# Patient Record
Sex: MALE | Race: WHITE | NOT HISPANIC OR LATINO | Employment: OTHER | URBAN - METROPOLITAN AREA
[De-identification: names, ages, dates, MRNs, and addresses within clinical notes are randomized per-mention and may not be internally consistent; named-entity substitution may affect disease eponyms.]

---

## 2020-01-15 ENCOUNTER — TELEPHONE (OUTPATIENT)
Dept: UROLOGY | Facility: CLINIC | Age: 80
End: 2020-01-15

## 2020-01-15 NOTE — TELEPHONE ENCOUNTER
----- Message from All Jhaveri sent at 1/15/2020 10:34 AM CST -----  Contact: Pt's wife  Please call Gregg's wife regarding his inability to urinate for the past 24hrs [phone] (home).

## 2020-01-16 ENCOUNTER — OFFICE VISIT (OUTPATIENT)
Dept: UROLOGY | Facility: CLINIC | Age: 80
End: 2020-01-16
Payer: MEDICARE

## 2020-01-16 VITALS
RESPIRATION RATE: 18 BRPM | BODY MASS INDEX: 33.32 KG/M2 | HEART RATE: 60 BPM | DIASTOLIC BLOOD PRESSURE: 68 MMHG | HEIGHT: 71 IN | SYSTOLIC BLOOD PRESSURE: 110 MMHG | WEIGHT: 238 LBS

## 2020-01-16 DIAGNOSIS — Z97.8 PRESENCE OF INDWELLING FOLEY CATHETER: Primary | ICD-10-CM

## 2020-01-16 LAB
BILIRUB UR QL STRIP: NEGATIVE
GLUCOSE UR QL STRIP: NEGATIVE
KETONES UR QL STRIP: NEGATIVE
LEUKOCYTE ESTERASE UR QL STRIP: POSITIVE
PH, POC UA: 7
POC AMORP, URINE: ABNORMAL
POC BACTI, URINE: ABNORMAL
POC BLOOD, URINE: POSITIVE
POC CASTS, URINE: ABNORMAL
POC CRYST, URINE: ABNORMAL
POC EPITH, URINE: ABNORMAL
POC HCG, URINE: ABNORMAL
POC HYALIN, URINE: ABNORMAL LPF
POC MUCUS, URINE: ABNORMAL
POC NITRATES, URINE: NEGATIVE
POC OTHER, URINE: ABNORMAL
POC RBC, URINE: >100 HPF
POC WBC, URINE: ABNORMAL HPF
PROT UR QL STRIP: POSITIVE
SP GR UR STRIP: 1.01 (ref 1–1.03)
UROBILINOGEN UR STRIP-ACNC: 0.2 (ref 0.3–2.2)

## 2020-01-16 PROCEDURE — 1159F MED LIST DOCD IN RCRD: CPT | Mod: S$GLB,,, | Performed by: NURSE PRACTITIONER

## 2020-01-16 PROCEDURE — 1159F PR MEDICATION LIST DOCUMENTED IN MEDICAL RECORD: ICD-10-PCS | Mod: S$GLB,,, | Performed by: NURSE PRACTITIONER

## 2020-01-16 PROCEDURE — 99214 OFFICE O/P EST MOD 30 MIN: CPT | Mod: S$GLB,,, | Performed by: NURSE PRACTITIONER

## 2020-01-16 PROCEDURE — 99214 PR OFFICE/OUTPT VISIT, EST, LEVL IV, 30-39 MIN: ICD-10-PCS | Mod: S$GLB,,, | Performed by: NURSE PRACTITIONER

## 2020-01-16 RX ORDER — WARFARIN SODIUM 5 MG/1
TABLET ORAL
COMMUNITY

## 2020-01-16 RX ORDER — TAMSULOSIN HYDROCHLORIDE 0.4 MG/1
0.8 CAPSULE ORAL NIGHTLY
Qty: 60 CAPSULE | Refills: 5 | Status: SHIPPED | OUTPATIENT
Start: 2020-01-16

## 2020-01-16 RX ORDER — ZIPRASIDONE HYDROCHLORIDE 20 MG/1
CAPSULE ORAL
COMMUNITY

## 2020-01-16 RX ORDER — CALCITRIOL 0.25 UG/1
CAPSULE ORAL
COMMUNITY
Start: 2020-01-08

## 2020-01-16 RX ORDER — WARFARIN 4 MG/1
TABLET ORAL
COMMUNITY

## 2020-01-16 RX ORDER — FUROSEMIDE 20 MG/1
TABLET ORAL
COMMUNITY
Start: 2019-12-26

## 2020-01-16 RX ORDER — NIFEDIPINE 90 MG/1
TABLET, EXTENDED RELEASE ORAL
COMMUNITY
Start: 2020-01-08

## 2020-01-16 RX ORDER — DIVALPROEX SODIUM 500 MG/1
TABLET, DELAYED RELEASE ORAL
COMMUNITY
Start: 2020-01-13

## 2020-01-16 RX ORDER — LEVOTHYROXINE SODIUM 50 UG/1
TABLET ORAL
COMMUNITY

## 2020-01-16 RX ORDER — SULFAMETHOXAZOLE AND TRIMETHOPRIM 800; 160 MG/1; MG/1
TABLET ORAL
COMMUNITY
Start: 2020-01-14

## 2020-01-16 RX ORDER — CARVEDILOL 12.5 MG/1
TABLET ORAL
COMMUNITY

## 2020-01-16 RX ORDER — TAMSULOSIN HYDROCHLORIDE 0.4 MG/1
CAPSULE ORAL
COMMUNITY
Start: 2019-12-18 | End: 2020-01-16 | Stop reason: SDUPTHER

## 2020-01-16 RX ORDER — PANTOPRAZOLE SODIUM 40 MG/1
TABLET, DELAYED RELEASE ORAL
COMMUNITY

## 2020-01-16 RX ORDER — POTASSIUM CHLORIDE 20 MEQ/1
TABLET, EXTENDED RELEASE ORAL
COMMUNITY

## 2020-01-16 NOTE — PROGRESS NOTES
Subjective:       Patient ID: Gregg Balderas is a 79 y.o. male.    Chief Complaint: Urinary Retention (indwellling rodrigez cath - yesterday @ [facility].) and Other (currently on bactrim ds)      HPI: 79-year-old  male known to the service Dr. Amee bautista with a history of BPH with LUTS, hematuria with workup in the past negative cytology cystoscopy findings were of prostatic in nature.  Patient was placed on Flomax which he continues daily at present.  Patient went into acute urinary retention Tuesday of this week; went to the emergency room in [place].  In and out catheterization -1300 cc residual.  Diagnosed with UTI started on antibiotics (Bactrim closed bracket pending culture) and sent home.  Back in retention Wednesday,  patient had not voided since the emergency department visit.  Return to ER department; Two way indwelling catheter / bag was placed; 800 cc urine recovered.    On evaluation today patient and wife state that the urine has cleared significantly in the tubing and  bag.  He is tolerating the antibiotics.  Preliminary culture report --gram negative rods, 1000 colony count, identification and sensitivity pending.  Patient has been afebrile throughout this current episode, appetite good, does not appear sickly on evaluation at present.  No other urologic complaint at this time       Past Medical History:   Past Medical History:   Diagnosis Date    BPH with urinary obstruction     CHF (congestive heart failure)     Dementia     DVT (deep venous thrombosis) 08/2014    GERD (gastroesophageal reflux disease)     Hematuria     HTN (hypertension)     Hypothyroidism        Past Surgical Historical:   Past Surgical History:   Procedure Laterality Date    BIOPSY WITH TRANSRECTAL ULTRASOUND (TRUS) GUIDANCE  11/30/2009    CYSTOSCOPY  02/06/2008    cysto, bladder washing        Medications:   Medication List with Changes/Refills   Current Medications    CALCITRIOL (ROCALTROL) 0.25 MCG  CAP        CARVEDILOL (COREG) 12.5 MG TABLET    carvedilol 12.5 mg tablet    DIVALPROEX (DEPAKOTE) 500 MG TBEC        FUROSEMIDE (LASIX) 20 MG TABLET        LEVOTHYROXINE (SYNTHROID) 50 MCG TABLET    levothyroxine 50 mcg tablet    NIFEDIPINE (PROCARDIA-XL) 90 MG (OSM) 24 HR TABLET        PANTOPRAZOLE (PROTONIX) 40 MG TABLET    pantoprazole 40 mg tablet,delayed release    POTASSIUM CHLORIDE SA (K-DUR,KLOR-CON) 20 MEQ TABLET    potassium chloride ER 20 mEq tablet,extended release(part/cryst)    SULFAMETHOXAZOLE-TRIMETHOPRIM 800-160MG (BACTRIM DS) 800-160 MG TAB        TAMSULOSIN (FLOMAX) 0.4 MG CAP        WARFARIN (COUMADIN) 4 MG TABLET    warfarin 4 mg tablet    WARFARIN (COUMADIN) 5 MG TABLET    warfarin 5 mg tablet    ZIPRASIDONE (GEODON) 20 MG CAP    ziprasidone 20 mg capsule        Past Social History:   Social History     Socioeconomic History    Marital status:      Spouse name: Not on file    Number of children: Not on file    Years of education: Not on file    Highest education level: Not on file   Occupational History    Not on file   Social Needs    Financial resource strain: Not on file    Food insecurity:     Worry: Not on file     Inability: Not on file    Transportation needs:     Medical: Not on file     Non-medical: Not on file   Tobacco Use    Smoking status: Never Smoker   Substance and Sexual Activity    Alcohol use: Not Currently    Drug use: Not on file    Sexual activity: Not on file   Lifestyle    Physical activity:     Days per week: Not on file     Minutes per session: Not on file    Stress: Not on file   Relationships    Social connections:     Talks on phone: Not on file     Gets together: Not on file     Attends [employer] service: Not on file     Active member of club or organization: Not on file     Attends meetings of clubs or organizations: Not on file     Relationship status: Not on file   Other Topics Concern    Not on file   Social History Narrative    Not on  file       Allergies: Review of patient's allergies indicates:  No Known Allergies     Family History: History reviewed. No pertinent family history.     Review of Systems:  Review of Systems   Constitutional: Negative for activity change and appetite change.   HENT: Negative for congestion and dental problem.    Eyes: Negative for visual disturbance.   Respiratory: Negative for chest tightness and shortness of breath.    Cardiovascular: Negative for chest pain.   Gastrointestinal: Negative for abdominal distention and abdominal pain.   Genitourinary: Negative for decreased urine volume, difficulty urinating, discharge, dysuria, enuresis, flank pain, frequency, genital sores, hematuria, penile pain, penile swelling, scrotal swelling, testicular pain and urgency.   Musculoskeletal: Negative for back pain and neck pain.   Skin: Negative for color change.   Neurological: Negative for dizziness.   Hematological: Negative for adenopathy.   Psychiatric/Behavioral: Negative for agitation, behavioral problems and confusion.       Physical Exam:  Physical Exam   Constitutional: He is oriented to person, place, and time. He appears well-developed and well-nourished.   HENT:   Head: Normocephalic.   Eyes: No scleral icterus.   Neck: Normal range of motion.   Cardiovascular: Intact distal pulses.    Pulmonary/Chest: Effort normal and breath sounds normal.   Abdominal: Soft. He exhibits no distension. There is no tenderness. No hernia. Hernia confirmed negative in the right inguinal area and confirmed negative in the left inguinal area.   Genitourinary: Testes normal and penis normal. Cremasteric reflex is present.   Genitourinary Comments: Patent indwelling Mosquera catheter to  bag, urine in tubing is clear very light pinkish blood tinge to collection in bag   Neurological: He is alert and oriented to person, place, and time.   Skin: Skin is warm and dry.     Psychiatric: He has a normal mood and affect.       Assessment/Plan:    Urinary retention--final urine culture  results pending--continue Bactrim as prescribed, per family urine has cleared significantly since starting antibiotic.  BPH with obstruction--increase Flomax to 0.8 mg q.h.s., Rx to pharmacy; Msoquera to remain until Monday at which time they will return Monday a.m. for voiding trial and review of final urine culture results    Problem List Items Addressed This Visit     None      Visit Diagnoses     Presence of indwelling Mosquera catheter    -  Primary

## 2020-01-20 ENCOUNTER — CLINICAL SUPPORT (OUTPATIENT)
Dept: UROLOGY | Facility: CLINIC | Age: 80
End: 2020-01-20
Payer: MEDICARE

## 2020-01-20 DIAGNOSIS — R33.9 URINARY RETENTION: ICD-10-CM

## 2020-01-20 DIAGNOSIS — Z97.8 PRESENCE OF INDWELLING FOLEY CATHETER: Primary | ICD-10-CM

## 2020-01-20 LAB
BILIRUB UR QL STRIP: NEGATIVE
GLUCOSE UR QL STRIP: NEGATIVE
KETONES UR QL STRIP: POSITIVE
LEUKOCYTE ESTERASE UR QL STRIP: POSITIVE
PH, POC UA: 5.5
POC AMORP, URINE: ABNORMAL
POC BACTI, URINE: ABNORMAL
POC BLOOD, URINE: POSITIVE
POC CASTS, URINE: ABNORMAL
POC CRYST, URINE: ABNORMAL
POC EPITH, URINE: ABNORMAL
POC HCG, URINE: ABNORMAL
POC HYALIN, URINE: ABNORMAL
POC MUCUS, URINE: ABNORMAL
POC NITRATES, URINE: NEGATIVE
POC OTHER, URINE: ABNORMAL
POC RBC, URINE: >100 HPF
POC WBC, URINE: ABNORMAL HPF
PROT UR QL STRIP: POSITIVE
SP GR UR STRIP: >1.03 (ref 1–1.03)
UROBILINOGEN UR STRIP-ACNC: 1 (ref 0.3–2.2)

## 2020-01-20 NOTE — PROGRESS NOTES
Per [facility] Boogie NP: Demetri d/c withour difficulty. Tolerated well. Educated to increase fluid intake and return to clinic or local ER if unable to void in 4-6 hours.